# Patient Record
(demographics unavailable — no encounter records)

---

## 2017-01-13 NOTE — TELEPHONE ENCOUNTER
Requested Prescriptions     Pending Prescriptions Disp Refills    atorvastatin (LIPITOR) 40 mg tablet       Si Tab daily.  metoprolol tartrate (LOPRESSOR) 25 mg tablet       Si Tab two (2) times a day.      Last office visit:       16   Last filled:      Lipitor:                          Metoprolol:

## 2017-01-14 RX ORDER — METOPROLOL TARTRATE 25 MG/1
25 TABLET, FILM COATED ORAL 2 TIMES DAILY
Qty: 60 TAB | Refills: 2 | Status: SHIPPED | OUTPATIENT
Start: 2017-01-14 | End: 2017-06-28 | Stop reason: SDUPTHER

## 2017-01-14 RX ORDER — ATORVASTATIN CALCIUM 40 MG/1
40 TABLET, FILM COATED ORAL DAILY
Qty: 30 TAB | Refills: 2 | Status: SHIPPED | OUTPATIENT
Start: 2017-01-14